# Patient Record
Sex: MALE | Race: WHITE
[De-identification: names, ages, dates, MRNs, and addresses within clinical notes are randomized per-mention and may not be internally consistent; named-entity substitution may affect disease eponyms.]

---

## 2019-04-05 ENCOUNTER — HOSPITAL ENCOUNTER (EMERGENCY)
Dept: HOSPITAL 61 - ER | Age: 29
Discharge: HOME | End: 2019-04-05
Payer: COMMERCIAL

## 2019-04-05 VITALS — SYSTOLIC BLOOD PRESSURE: 133 MMHG | DIASTOLIC BLOOD PRESSURE: 91 MMHG

## 2019-04-05 VITALS — WEIGHT: 276 LBS | HEIGHT: 71 IN | BODY MASS INDEX: 38.64 KG/M2

## 2019-04-05 DIAGNOSIS — I10: ICD-10-CM

## 2019-04-05 DIAGNOSIS — E78.00: ICD-10-CM
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
## 2019-04-05 DIAGNOSIS — S05.8X1A: Primary | ICD-10-CM
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
## 2019-04-05 PROCEDURE — 99283 EMERGENCY DEPT VISIT LOW MDM: CPT

## 2019-04-05 NOTE — PHYS DOC
Past Medical History


Past Medical History:  High Cholesterol, Hypertension


Past Surgical History:  Other


Additional Past Surgical Histo:  UETER SURGERY


Alcohol Use:  Occasionally


Drug Use:  None





Adult General


Chief Complaint


Chief Complaint:  EYE PROBLEMS





[facility]





Patient is a 28-year-old male who presents with complaint of right eye pain 

that started earlier this afternoon. Patient states he was walking down to the 

dock and felt something in his eye suddenly. He thought that maybe it was just 

an eyelash and when he looked in his eye, he did see an eyelash around where 

his lower eyelid been. He states that he was able to remove the eyelash but 

shortly thereafter, he felt like he had another eyelash or foreign body in his 

eye. Patient was seen over at urgent care and had slit lamp evaluation and was 

told that he had a small spot in his eye and that he needed to see an 

ophthalmologist. Patient denies having had any change was vision. He does 

report to some mild pain at this time.





Review of Systems


Review of Systems





Constitutional: Denies fever or chills []


Eyes: Denies change in visual acuity. Complains of right eye pain and swelling 

to the right upper eyelid []


Respiratory: Denies cough or shortness of breath []


Cardiovascular: No additional information not addressed in HPI []





Current Medications


Current Medications





Current Medications








 Medications


  (Trade)  Dose


 Ordered  Sig/Darya  Start Time


 Stop Time Status Last Admin


Dose Admin


 


 Fluorescein Sodium


  (Ful-Verónica)  1 strip  1X  ONCE  4/5/19 20:50


 4/5/19 20:53 DC 4/5/19 20:53


1 STRIP











Allergies


Allergies





Allergies








Coded Allergies Type Severity Reaction Last Updated Verified


 


  No Known Drug Allergies    4/5/19 No











Physical Exam


Physical Exam





Constitutional: Well developed, well nourished, no acute distress, non-toxic 

appearance. []


Eyes: PERRLA, EOMI. right eye examined with floor seen and blacklight, 

demonstrating very small abrasion to the sclera at 7:30 position [] 


Neck: Normal range of motion, no tenderness, supple, no stridor. [] 


Cardiovascular:Heart rate regular rhythm, no murmur []


Lungs & Thorax:  Bilateral breath sounds clear to auscultation []





Current Patient Data


Vital Signs





 Vital Signs








  Date Time  Temp Pulse Resp B/P (MAP) Pulse Ox O2 Delivery O2 Flow Rate FiO2


 


4/5/19 20:33 98.3 93 16 133/91 (105) 97 Room Air  





 98.3       











EKG


EKG


[]





Radiology/Procedures


Radiology/Procedures


[]





Course & Med Decision Making


Course & Med Decision Making


Pertinent Labs and Imaging studies reviewed. (See chart for details)





[]





Dragon Disclaimer


Dragon Disclaimer


This electronic medical record was generated, in whole or in part, using a 

voice recognition dictation system.





Departure


Departure


Impression:  


 Primary Impression:  


 Abrasion of sclera of right eye


Disposition:  01 HOME, SELF-CARE


Condition:  STABLE


Patient Instructions:  Eye - Corneal Abrasion





Additional Instructions:  


Use erythromycin ophthalmic ointment 3 times a day for 7 days. Follow-up with 

ophthalmologist if symptoms worsen to include change in visual acuity, increase 

in pain, swelling or significant photophobia.





Problem Qualifiers








 Primary Impression:  


 Abrasion of sclera of right eye


 Encounter type:  initial encounter  Qualified Codes:  S05.8X1A - Other 

injuries of right eye and orbit, initial encounter








ROBERT WASHINGTON Jr., DO Apr 5, 2019 21:03